# Patient Record
Sex: MALE | URBAN - METROPOLITAN AREA
[De-identification: names, ages, dates, MRNs, and addresses within clinical notes are randomized per-mention and may not be internally consistent; named-entity substitution may affect disease eponyms.]

---

## 2022-10-19 ENCOUNTER — TRANSFERRED RECORDS (OUTPATIENT)
Dept: HEALTH INFORMATION MANAGEMENT | Facility: CLINIC | Age: 57
End: 2022-10-19

## 2022-11-06 ENCOUNTER — TRANSFERRED RECORDS (OUTPATIENT)
Dept: HEALTH INFORMATION MANAGEMENT | Facility: CLINIC | Age: 57
End: 2022-11-06

## 2023-01-05 ENCOUNTER — MEDICAL CORRESPONDENCE (OUTPATIENT)
Dept: HEALTH INFORMATION MANAGEMENT | Facility: CLINIC | Age: 58
End: 2023-01-05

## 2023-01-24 ENCOUNTER — TRANSFERRED RECORDS (OUTPATIENT)
Dept: HEALTH INFORMATION MANAGEMENT | Facility: CLINIC | Age: 58
End: 2023-01-24

## 2023-02-24 ENCOUNTER — TRANSFERRED RECORDS (OUTPATIENT)
Dept: HEALTH INFORMATION MANAGEMENT | Facility: CLINIC | Age: 58
End: 2023-02-24

## 2023-06-09 ENCOUNTER — TRANSFERRED RECORDS (OUTPATIENT)
Dept: HEALTH INFORMATION MANAGEMENT | Facility: CLINIC | Age: 58
End: 2023-06-09

## 2023-06-10 ENCOUNTER — TRANSFERRED RECORDS (OUTPATIENT)
Dept: HEALTH INFORMATION MANAGEMENT | Facility: CLINIC | Age: 58
End: 2023-06-10

## 2023-06-10 LAB — EJECTION FRACTION: NORMAL %

## 2023-06-12 ENCOUNTER — TRANSFERRED RECORDS (OUTPATIENT)
Dept: HEALTH INFORMATION MANAGEMENT | Facility: CLINIC | Age: 58
End: 2023-06-12

## 2023-07-18 ENCOUNTER — TRANSFERRED RECORDS (OUTPATIENT)
Dept: HEALTH INFORMATION MANAGEMENT | Facility: CLINIC | Age: 58
End: 2023-07-18

## 2023-11-07 ENCOUNTER — TRANSCRIBE ORDERS (OUTPATIENT)
Dept: OTHER | Age: 58
End: 2023-11-07

## 2023-11-07 ENCOUNTER — TELEPHONE (OUTPATIENT)
Dept: CARDIOLOGY | Facility: CLINIC | Age: 58
End: 2023-11-07

## 2023-11-07 DIAGNOSIS — I27.20 PULMONARY HYPERTENSION (H): ICD-10-CM

## 2023-11-07 DIAGNOSIS — E78.5 DYSLIPIDEMIA: Primary | ICD-10-CM

## 2023-11-07 DIAGNOSIS — E61.1 IRON DEFICIENCY: ICD-10-CM

## 2023-11-07 DIAGNOSIS — I27.20 PULMONARY HYPERTENSION (H): Primary | ICD-10-CM

## 2023-11-07 DIAGNOSIS — Z11.59 NEED FOR HEPATITIS B SCREENING TEST: ICD-10-CM

## 2023-11-07 DIAGNOSIS — R06.02 SOB (SHORTNESS OF BREATH): ICD-10-CM

## 2023-11-07 NOTE — TELEPHONE ENCOUNTER
[facility] Call Center    Phone Message    May a detailed message be left on voicemail: yes     Reason for Call: Appointment Intake    Referring Provider Name: Robbie Cruz MD  Diagnosis and/or Symptoms: NEW patient PH, please assist patient with scheduling.    Action Taken: Message routed to:  [facility] Cardio    Travel Screening: Not Applicable           ------------------------------------------------------------            Marcy Arriaga sent to  Cardiology Ph Nurse-  Caller: Unspecified (2 days ago, 11:07 AM)  This patient needs a lot of testing    [employer]  PFT  6 min walk  V/Q

## 2023-11-08 ENCOUNTER — PRE VISIT (OUTPATIENT)
Dept: CARDIOLOGY | Facility: CLINIC | Age: 58
End: 2023-11-08

## 2023-11-08 NOTE — TELEPHONE ENCOUNTER
RECORDS RECEIVED FROM:    DATE RECEIVED:    GENERAL RECORDS STATUS DETAILS   OFFICE NOTE from cardiologists Internal 11-3-23 [place]   EKG (STRIPS & REPORTS) Care Everywhere 7-18-23 -[facility]   ECHOS (IMAGES AND REPORTS) Received 6-10-23 -[facility]   PULMONARY HYPERTENSION      6 MINUTE WALK TEST N/A    PULMONARY FUNCTION TESTS Care Everywhere 2-24-23 -[facility]   RIGHT HEART CATH (IMAGES) Received 6-12-23 -[facility] 1-9-23 -[facility]   SLEEP STUDY / OVERNIGHT OXIMETRY In process 2-27-23 -[facility]   XR CHEST   (IMAGES AND REPORTS) Received 7-18-23 -[facility]   CHEST CT  (IMAGES AND REPORTS) Received 11-6-22 [facility] -CTA Chest    V/Q SCAN (IMAGES) N/A    LIVER US  (IMAGES AND REPORTS) Received 1-24-23 -[facility]   ANGIOGRAMS (IMAGES) Received 10-17-22 [facility] -CT Angio Chest   STRESS TEST   (IMAGES AND REPORTS) Received 10-17-22 [facility]     Action    Action Taken [place] Records and Imaging Requested:    EKG Strips  Overnight Oximetry Report 7-18-23 2-27-23   ECHO Images  Cath Images  XR Chest Images  US ABD Images  CTA Chest Images 6-10-23  6-12-23 AND 1-9-23 7-18-23 1-24-23 11-6-22      11-14 Records sent to scanning  Imaging resolved in PACS.     Action    Action Taken STEPHAN [facility] Records and Imaging requested:    CTA Chest Images  NM Cardiac Spect Rest and Stress 10-17-22  10-17-22       Records sent to scanning  Images in pACS

## 2023-11-09 RX ORDER — BACLOFEN 10 MG/1
5 TABLET ORAL 3 TIMES DAILY PRN
COMMUNITY
Start: 2023-06-19 | End: 2024-02-14

## 2023-11-09 RX ORDER — TIOTROPIUM BROMIDE AND OLODATEROL 3.124; 2.736 UG/1; UG/1
2 SPRAY, METERED RESPIRATORY (INHALATION) DAILY
COMMUNITY
Start: 2023-03-06

## 2023-11-09 RX ORDER — MULTIVITAMIN WITH IRON
250 TABLET ORAL DAILY
COMMUNITY
Start: 2022-11-15

## 2023-11-09 RX ORDER — FOLIC ACID 1 MG/1
1 TABLET ORAL DAILY
COMMUNITY
Start: 2022-11-09

## 2023-11-09 RX ORDER — LISINOPRIL 40 MG/1
40 TABLET ORAL DAILY
COMMUNITY

## 2023-11-09 RX ORDER — AMLODIPINE BESYLATE 2.5 MG/1
2.5 TABLET ORAL DAILY
COMMUNITY
Start: 2023-08-28

## 2023-11-09 RX ORDER — OMEPRAZOLE 20 MG/1
20 TABLET, DELAYED RELEASE ORAL 2 TIMES DAILY
COMMUNITY

## 2023-11-09 RX ORDER — FUROSEMIDE 40 MG
40 TABLET ORAL DAILY
COMMUNITY
Start: 2023-07-11 | End: 2024-07-15

## 2023-11-09 RX ORDER — ROSUVASTATIN CALCIUM 40 MG/1
40 TABLET, COATED ORAL DAILY
COMMUNITY
Start: 2022-11-09 | End: 2023-12-26

## 2023-11-09 RX ORDER — ASPIRIN 81 MG/1
81 TABLET, CHEWABLE ORAL DAILY
COMMUNITY
Start: 2022-11-09

## 2023-11-09 RX ORDER — METOPROLOL SUCCINATE 25 MG/1
25 TABLET, EXTENDED RELEASE ORAL DAILY
COMMUNITY
Start: 2022-12-02 | End: 2023-12-07

## 2023-11-09 RX ORDER — ALBUTEROL SULFATE 0.83 MG/ML
2.5 SOLUTION RESPIRATORY (INHALATION) EVERY 4 HOURS PRN
COMMUNITY
Start: 2023-08-16 | End: 2024-08-20

## 2023-11-09 RX ORDER — B-COMPLEX WITH VITAMIN C
1 TABLET ORAL DAILY
COMMUNITY

## 2023-11-09 RX ORDER — SPIRONOLACTONE 25 MG/1
25 TABLET ORAL DAILY
COMMUNITY
Start: 2023-06-20 | End: 2024-06-24

## 2023-11-13 NOTE — TELEPHONE ENCOUNTER
2x Called and LVM to schedule NEW PH with testing      Marcy Arriaga  Clinic    Pulmonary Hypertension   [facility]  (P) [phone]

## 2023-12-04 NOTE — TELEPHONE ENCOUNTER
Minh attempts going to send a letter       Marcy Arriaga  Clinic    Pulmonary Hypertension   [facility]  (P) [phone]

## 2023-12-22 ENCOUNTER — TELEPHONE (OUTPATIENT)
Dept: INTERNAL MEDICINE | Facility: CLINIC | Age: 58
End: 2023-12-22

## 2023-12-22 NOTE — TELEPHONE ENCOUNTER
Patient called and LVM to cancel his appointment for yesterday . Tried calling him this am at 10:05 no answer or vm set up to reschedule     NEW CHRISSY /labs prior     Marcy Arriaga  Clinic    Pulmonary Hypertension   [facility]  (p) [phone]

## 2024-08-01 ENCOUNTER — TELEPHONE (OUTPATIENT)
Dept: CARDIOLOGY | Facility: CLINIC | Age: 59
End: 2024-08-01

## 2024-08-01 NOTE — TELEPHONE ENCOUNTER
[facility] Call Center     Phone Message     May a detailed message be left on voicemail: yes      Reason for Call: Appointment Intake    Referring Provider Name: Robbie Cruz MD  Diagnosis and/or Symptoms: NEW patient PH, please assist patient with scheduling.     Action Taken: Message routed to:  [facility] Cardio     Travel Screening: Not Applicable